# Patient Record
(demographics unavailable — no encounter records)

---

## 2024-10-29 NOTE — PHYSICAL EXAM
[No Acute Distress] : no acute distress [Well Nourished] : well nourished [Normal Oropharynx] : the oropharynx was normal [No JVD] : no jugular venous distention [Supple] : supple [No Respiratory Distress] : no respiratory distress  [Clear to Auscultation] : lungs were clear to auscultation bilaterally [Normal Rate] : normal rate  [Regular Rhythm] : with a regular rhythm [Normal S1, S2] : normal S1 and S2 [No Edema] : there was no peripheral edema [No Extremity Clubbing/Cyanosis] : no extremity clubbing/cyanosis [Soft] : abdomen soft [Normal Bowel Sounds] : normal bowel sounds [Normal Posterior Cervical Nodes] : no posterior cervical lymphadenopathy [Normal Anterior Cervical Nodes] : no anterior cervical lymphadenopathy [No Rash] : no rash [Normal Affect] : the affect was normal [Normal Insight/Judgement] : insight and judgment were intact

## 2024-10-29 NOTE — HISTORY OF PRESENT ILLNESS
[FreeTextEntry1] :  The patient comes in for a follow-up pulmonary evaluation. [de-identified] : The patient has a history of bronchiectasis and a chronic cough. The patient had previously been followed by another pulmonologist, Dr. Bridges. He had undergone CAT scans of the chest almost on a yearly basis between 2019, and 2024. He had been maintained on Breo 200/25 MCG, 1 puff daily. He appeared to have waxing and waning infiltrates scattered throughout both lung fields, with tree in bud abnormalities documented. He came to see me in March 2024, for a second opinion.  At the time of his initial visit in March 2024, the patient was placed on n-acetyl cysteine 600 MG BID as an anti-inflammatory and mucolytic medication. He was also instructed to begin using an acapella valve in an effort to help improve the clearance of inspissated secretions. He was told to continue the Breo as prescribed. The patient was also instructed to undergo a sputum analysis for routine culture and AFB. The study was negative for TB. Additionally, the patient underwent serologic bloodwork, which was only remarkable for an elevated rheumatoid factor.   The patient underwent a bronchoscopy with me on 6/6/24, which was unremarkable. He did speak with Dr. Hardwick on 7/24/24 c/o of hemoptysis, coughing up clots the size of a quarter. The patient had been diagnosed with COVID 2 weeks prior and was treated with Paxlovid. Dr. Hardwick advised the patient to report to the ER immediately.  The patient did undergo an evaluation in the emergency room, where he had a CT angiogram of the chest performed. He was sent home on a regimen of Doxycycline 100 MG BID for 14 days and he was also given a prescription for prednisone to take 40 mg daily for 4 days. The patient returned to baseline in this regard after taking the medication.   At this time, the patient is feeling well from a pulmonary standpoint. The patient continues to use Breo 200-25 MCG/ACT 1 puff daily for his bronchiectasis. The patient continues to produce sputum, stating it is more than 2 tablespoons daily. The patient reports that he does not cough unless he is breathing heavily. There has been no hemoptysis or wheeze. There have been no fevers, chills, or night sweats. There has been no chest pain, shortness of breath, palpitations, or PND. There have been no other acute constitutional symptoms. He comes in for this assessment.

## 2024-10-29 NOTE — DATA REVIEWED
[FreeTextEntry1] : Spirometric analysis with DLCO was performed.  The vital capacity is mildly reduced.  Lung mechanics reveal a mild decrease in flow rates.  Slight bronchodilator reactivity is demonstrated.  The DLCO and saturation are maintained.  This represents a mild degree of restriction.  Mild underlying obstruction is noted in addition. When compared to the prior study, the lung volume is mildly diminished.  Bronchial cultures were negative for AFB/NICOLLE

## 2024-10-29 NOTE — ADDENDUM
[FreeTextEntry1] : This note was written by Mary William on 10/29/2024 acting as a medical scribe for Dr. Lester Carroll MD. All medical entries made by the scribe were at my, Dr. Lester Carroll's, direction and personally dictated by me on 10/29/2024. I have reviewed the chart and agree that the record accurately reflects my personal performance of the history, review of systems, assessment, and plan. I have also personally directed, reviewed, and agreed with the chart.

## 2024-10-29 NOTE — PLAN
[FreeTextEntry1] : 1. Continue current medications as outlined above.  2. The patient will begin taking Azithromycin 500 MG TIW for maintenance of bronchiectasis.  Of note is the fact, that the deep bronchial cultures from the bronchoscopy, were negative for NICOLLE.  3. The patient has been advised to consider using an Aerobika device to help improve the clearance of inspissated secretions.  In the meantime, he will continue with the Acapella valve, as he states that he is producing copious amounts of sputum almost on a daily basis.  4. The patient will undergo a follow up CT scan of the chest in 6 months to re-evaluate bronchiectasis after the institution of Azithromycin into his regimen.    5. Follow up in 6 months with PFT.   6. The COVID and Influenza vaccinations are recommended at this time.    7. Cardiovascular exercise as tolerated.

## 2025-05-20 NOTE — ADDENDUM
[FreeTextEntry1] : This note was written by Mary William on 05/20/2025 acting as a medical scribe for Dr. Lester Carroll MD. All medical entries made by the scribe were at my, Dr. Lester Carroll's, direction and personally dictated by me on 05/20/2025. I have reviewed the chart and agree that the record accurately reflects my personal performance of the history, review of systems, assessment, and plan. I have also personally directed, reviewed, and agreed with the chart.    no yes

## 2025-05-20 NOTE — PLAN
[FreeTextEntry1] : 1. Continue current medications as outlined above.  2. The patient received the Prevnar 21 vaccine in office today, 5/20/25.   3. The patient will begin taking Prednisone 20 MG. The patient will take 60 MG x3 days, 40 MG x3 days, 20 MG x4 days, and 10 MG x4 days.  4. The patient will hold the Azithromycin 500 MG TIW, and instead begin taking Ceftin 500 MG BID x10 days. Once he finishes this course of Ceftin, the patient will resume the Azithromycin 500 MG TIW as instructed.   5. The patient will begin using an Aerobika device BID to help improve the clearance of inspissated secretions.  He will hold off on the Acapella valve.  The patient will use the device with a nebulizer with hypertonic saline TIW. Of note, we may consider the institution of Brensocatib 25 MG daily into the patient's regimen when the medication becomes available.   6. The patient will undergo a repeat chest CT in August 2025 re-evaluate bronchiectatic changes, which appeared to have worsened on the most recent study.  We will be looking for improvement after treatment for this exacerbation as documented above.   7. Follow up in 3 months with JUSTINE Rehman with office visit and to review above noted chest CT.   8. Follow up on 1/6/26 with pre-post spirometry and DLCO   9. Routine medical follow-up with his primary care physician, Dr. Clarke.    10. The Influenza and COVID vaccines are recommended in the fall.   11. Cardiovascular exercise as tolerated.

## 2025-05-20 NOTE — PHYSICAL EXAM
[Normal Bowel Sounds] : normal bowel sounds [de-identified] : Very thin appearing male [de-identified] : Overall there was good air entry bilaterally, however slight expiratory rhonchi were noted with forced maneuvers.

## 2025-05-20 NOTE — HISTORY OF PRESENT ILLNESS
[FreeTextEntry1] :  The patient comes in for a comprehensive pulmonary evaluation. [de-identified] : At this time, the patient is feeling well from a pulmonary standpoint. The patient continues to use Breo 200-25 MCG/ACT 1 puff daily and  MG BID for his bronchiectasis. At the time of his last visit in October 2024, I added Azithromycin 500 MG TIW to his regimen for maintenance of bronchiectasis. The patient continues to produce sputum, stating it is more than 2 tablespoons daily. The patient reports that he does not cough unless he is breathing heavily, and notes that the sputum production increases nocturnally. He states that the amount of sputum production has remained stable. There has been no wheeze. He admits that he has not been using his acapella valve as of late, because he is able to expectorate the mucus without it.   Of note, the patient underwent a follow up chest CT in May 2025 to re-evaluate bronchiectasis after the institution of Azithromycin into his regimen. This chest CT showed an increase in the tree-in-bud opacities and mucus plugging in the RUL. It also revealed a mild increase in extensive bronchiectasis, bronchial wall thickening and mucus plugging in the RML and RLL. Additionally, multiple new tree-in-bud opacities and increased bronchiectasis were shown in the lingula. There have been no fevers, chills, or night sweats. There has been no chest pain, shortness of breath, palpitations, or PND. There have been no other acute constitutional symptoms. He comes in for this assessment.

## 2025-05-20 NOTE — DATA REVIEWED
[FreeTextEntry1] : CT scan of the chest performed on 5/10/2025 was reviewed by myself.  The patient has noted to have an increase in tree-in-bud opacities, worsening bronchial wall thickening, with mucous plugging bilaterally.  A pulmonary function test is performed.  Lung volumes are within normal limits.  Lung mechanics reveal a mild to moderate decrease in flow rates.  Mild to moderate bronchodilator reactivity is demonstrated.  The DLCO is maintained.  The saturation is 97%.  This represents a mild to moderate degree of obstruction with airway reactivity.  When compared to the prior study, the flow rates and lung volumes have diminished slightly.